# Patient Record
Sex: FEMALE | Race: WHITE | Employment: UNEMPLOYED | URBAN - METROPOLITAN AREA
[De-identification: names, ages, dates, MRNs, and addresses within clinical notes are randomized per-mention and may not be internally consistent; named-entity substitution may affect disease eponyms.]

---

## 2017-09-12 ENCOUNTER — HOSPITAL ENCOUNTER (EMERGENCY)
Age: 57
Discharge: HOME | End: 2017-09-12
Attending: EMERGENCY MEDICINE
Payer: COMMERCIAL

## 2017-09-12 VITALS
HEIGHT: 65 IN | RESPIRATION RATE: 16 BRPM | SYSTOLIC BLOOD PRESSURE: 124 MMHG | DIASTOLIC BLOOD PRESSURE: 74 MMHG | OXYGEN SATURATION: 100 % | WEIGHT: 204 LBS | TEMPERATURE: 98.2 F | HEART RATE: 77 BPM | BODY MASS INDEX: 33.99 KG/M2

## 2017-09-12 DIAGNOSIS — M25.561 PAIN IN BOTH KNEES, UNSPECIFIED CHRONICITY: Primary | ICD-10-CM

## 2017-09-12 DIAGNOSIS — M25.562 PAIN IN BOTH KNEES, UNSPECIFIED CHRONICITY: Primary | ICD-10-CM

## 2017-09-12 PROCEDURE — 99282 EMERGENCY DEPT VISIT SF MDM: CPT

## 2017-09-12 RX ORDER — OXYCODONE AND ACETAMINOPHEN 5; 325 MG/1; MG/1
TABLET ORAL
Qty: 12 TAB | Refills: 0 | Status: SHIPPED | OUTPATIENT
Start: 2017-09-12

## 2017-09-12 RX ORDER — PRAVASTATIN SODIUM 20 MG/1
20 TABLET ORAL
COMMUNITY

## 2017-09-12 RX ORDER — HYDROCODONE BITARTRATE AND ACETAMINOPHEN 5; 325 MG/1; MG/1
TABLET ORAL
Qty: 12 TAB | Refills: 0 | Status: SHIPPED | OUTPATIENT
Start: 2017-09-12 | End: 2017-09-12

## 2017-09-12 RX ORDER — METOPROLOL TARTRATE 50 MG/1
50 TABLET ORAL 2 TIMES DAILY
COMMUNITY

## 2017-09-12 RX ORDER — LEVOTHYROXINE SODIUM 50 UG/1
50 TABLET ORAL
COMMUNITY

## 2017-09-12 RX ORDER — IBUPROFEN 600 MG/1
600 TABLET ORAL
Qty: 20 TAB | Refills: 0 | Status: SHIPPED | OUTPATIENT
Start: 2017-09-12 | End: 2017-09-12

## 2017-09-12 NOTE — DISCHARGE INSTRUCTIONS
Knee Pain or Injury: Care Instructions  Your Care Instructions    Injuries are a common cause of knee problems. Sudden (acute) injuries may be caused by a direct blow to the knee. They can also be caused by abnormal twisting, bending, or falling on the knee. Pain, bruising, or swelling may be severe, and may start within minutes of the injury. Overuse is another cause of knee pain. Other causes are climbing stairs, kneeling, and other activities that use the knee. Everyday wear and tear, especially as you get older, also can cause knee pain. Rest, along with home treatment, often relieves pain and allows your knee to heal. If you have a serious knee injury, you may need tests and treatment. Follow-up care is a key part of your treatment and safety. Be sure to make and go to all appointments, and call your doctor if you are having problems. It's also a good idea to know your test results and keep a list of the medicines you take. How can you care for yourself at home? · Be safe with medicines. Read and follow all instructions on the label. ¨ If the doctor gave you a prescription medicine for pain, take it as prescribed. ¨ If you are not taking a prescription pain medicine, ask your doctor if you can take an over-the-counter medicine. · Rest and protect your knee. Take a break from any activity that may cause pain. · Put ice or a cold pack on your knee for 10 to 20 minutes at a time. Put a thin cloth between the ice and your skin. · Prop up a sore knee on a pillow when you ice it or anytime you sit or lie down for the next 3 days. Try to keep it above the level of your heart. This will help reduce swelling. · If your knee is not swollen, you can put moist heat, a heating pad, or a warm cloth on your knee. · If your doctor recommends an elastic bandage, sleeve, or other type of support for your knee, wear it as directed.   · Follow your doctor's instructions about how much weight you can put on your leg. Use a cane, crutches, or a walker as instructed. · Follow your doctor's instructions about activity during your healing process. If you can do mild exercise, slowly increase your activity. · Reach and stay at a healthy weight. Extra weight can strain the joints, especially the knees and hips, and make the pain worse. Losing even a few pounds may help. When should you call for help? Call 911 anytime you think you may need emergency care. For example, call if:  · You have symptoms of a blood clot in your lung (called a pulmonary embolism). These may include:  ¨ Sudden chest pain. ¨ Trouble breathing. ¨ Coughing up blood. Call your doctor now or seek immediate medical care if:  · You have severe or increasing pain. · Your leg or foot turns cold or changes color. · You cannot stand or put weight on your knee. · Your knee looks twisted or bent out of shape. · You cannot move your knee. · You have signs of infection, such as:  ¨ Increased pain, swelling, warmth, or redness. ¨ Red streaks leading from the knee. ¨ Pus draining from a place on your knee. ¨ A fever. · You have signs of a blood clot in your leg (called a deep vein thrombosis), such as:  ¨ Pain in your calf, back of the knee, thigh, or groin. ¨ Redness and swelling in your leg or groin. Watch closely for changes in your health, and be sure to contact your doctor if:  · You have tingling, weakness, or numbness in your knee. · You have any new symptoms, such as swelling. · You have bruises from a knee injury that last longer than 2 weeks. · You do not get better as expected. Where can you learn more? Go to http://ese-tera.info/. Enter K195 in the search box to learn more about \"Knee Pain or Injury: Care Instructions. \"  Current as of: March 20, 2017  Content Version: 11.3  © 1262-1765 [employer].  Care instructions adapted under license by [employer] (which disclaims liability or warranty for this information). If you have questions about a medical condition or this instruction, always ask your healthcare professional. [place] [address] any warranty or liability for your use of this information.

## 2017-09-12 NOTE — ED PROVIDER NOTES
HPI Comments: The patient is a 62 y.o. female with a history of arthritis, presents to the ED with complaints of bilateral knee pain that began this morning after she woke up. The patient denies any trauma or strenuous activity. The patient states that she was at her orthopedist three weeks ago and received shots that she cannot identify. The patient states that her pain has never been this severe. She states she does not usually take any medication for her pain, but today she had taken one of her uncle's Percocet. The patient denies any numbness or tingling. She states she has never had surgery on her knees. The patient is currently out of town and is visiting the area for 1 month. Patient has no further complaints. Past Medical History:   Diagnosis Date    Ill-defined condition     hypothyroid, A flutter, high cholesterol       History reviewed. No pertinent surgical history. History reviewed. No pertinent family history. Social History     Social History    Marital status: SINGLE     Spouse name: N/A    Number of children: N/A    Years of education: N/A     Occupational History    Not on file. Social History Main Topics    Smoking status: Current Every Day Smoker     Packs/day: 1.00    Smokeless tobacco: Never Used    Alcohol use No    Drug use: No    Sexual activity: Not on file     Other Topics Concern    Not on file     Social History Narrative    No narrative on file         ALLERGIES: Review of patient's allergies indicates no known allergies. Review of Systems   Musculoskeletal:        Bilateral knee pain. Neurological: Negative for numbness. All other systems reviewed and are negative. Vitals:    09/12/17 1811   BP: 124/74   Pulse: 77   Resp: 16   Temp: 98.2 °F (36.8 °C)   SpO2: 100%   Weight: 92.5 kg (204 lb)   Height: 5' 5\" (1.651 m)            Physical Exam   Constitutional: She is oriented to person, place, and time.  She appears well-developed and well-nourished. HENT:   Head: Normocephalic and atraumatic. Neck: Neck supple. No JVD present. Musculoskeletal: She exhibits no edema. BL knees:  Bilateral medial joint tenderness. Full range of motion. No edema. Negative for bilateral anterior drawer. No instability with medial or lateral stress bilaterally. No calf edema or tenderness. Neurological: She is alert and oriented to person, place, and time. Gross sensation intact. Skin: Skin is warm and dry. No erythema. MDM  Number of Diagnoses or Management Options  Pain in both knees, unspecified chronicity:   Diagnosis management comments: 63 y/o female presents with BL knee pain  Acute exacerbation of chronic pain    Denies injury or trauma, no indication for imaging    Unable to have nsaids as on xarelto    Short course of percocet, ortho follow up. ED Course     Vitals:  Patient Vitals for the past 12 hrs:   Temp Pulse Resp BP SpO2   09/12/17 1811 98.2 °F (36.8 °C) 77 16 124/74 100 %         Reevaluation of the patient:   Discussed return precautions with pt, stable for dc home. Diagnosis:   1. Pain in both knees, unspecified chronicity        Disposition: discharged    Follow-up Information     Follow up With Details Comments [address], [place]  As needed [id number] [facility]  [address] [facility]  [phone]             Patient's Medications   Start Taking    OXYCODONE-ACETAMINOPHEN (PERCOCET) 5-325 MG PER TABLET    Take 1 tablet every 4-6 hours as needed for pain control. If you were instructed to try over the counter ibuprofen or tylenol, only take the percocet for pain not controlled with the over the counter medication. Continue Taking    LEVOTHYROXINE (SYNTHROID) 50 MCG TABLET    Take 50 mcg by mouth Daily (before breakfast). METOPROLOL TARTRATE (LOPRESSOR) 50 MG TABLET    Take 50 mg by mouth two (2) times a day.     OTHER        PRAVASTATIN (PRAVACHOL) 20 MG TABLET Take 20 mg by mouth nightly. RIVAROXABAN (XARELTO) 10 MG TABLET    Take 10 mg by mouth daily. These Medications have changed    No medications on file   Stop Taking    No medications on file       Tate Mendez Anam acting as a scribe for and in the presence of Dakotah Waters DO      September 12, 2017 at 6:32 PM       Provider Attestation:      I personally performed the services described in the documentation, reviewed the documentation, as recorded by the scribe in my presence, and it accurately and completely records my words and actions.  September 12, 2017 at 6:32 PM - Dakotah Waters DO        Procedures